# Patient Record
Sex: FEMALE | URBAN - METROPOLITAN AREA
[De-identification: names, ages, dates, MRNs, and addresses within clinical notes are randomized per-mention and may not be internally consistent; named-entity substitution may affect disease eponyms.]

---

## 2019-02-04 ENCOUNTER — APPOINTMENT (OUTPATIENT)
Dept: URBAN - METROPOLITAN AREA CLINIC 190 | Age: 39
Setting detail: DERMATOLOGY
End: 2019-02-05

## 2019-02-04 DIAGNOSIS — Z41.9 ENCOUNTER FOR PROCEDURE FOR PURPOSES OTHER THAN REMEDYING HEALTH STATE, UNSPECIFIED: ICD-10-CM

## 2019-02-04 PROCEDURE — OTHER CHEMICAL PEEL: OTHER

## 2019-02-04 PROCEDURE — OTHER MICRODERMABRASION: OTHER

## 2019-02-04 ASSESSMENT — LOCATION DETAILED DESCRIPTION DERM
LOCATION DETAILED: LEFT SUPERIOR CENTRAL BUCCAL CHEEK
LOCATION DETAILED: RIGHT CHIN
LOCATION DETAILED: LEFT LOWER CUTANEOUS LIP
LOCATION DETAILED: INFERIOR MID FOREHEAD
LOCATION DETAILED: RIGHT INFERIOR CENTRAL MALAR CHEEK
LOCATION DETAILED: LEFT INFERIOR CENTRAL MALAR CHEEK
LOCATION DETAILED: LEFT INFERIOR MEDIAL FOREHEAD

## 2019-02-04 ASSESSMENT — LOCATION SIMPLE DESCRIPTION DERM
LOCATION SIMPLE: INFERIOR FOREHEAD
LOCATION SIMPLE: LEFT FOREHEAD
LOCATION SIMPLE: RIGHT CHEEK
LOCATION SIMPLE: CHIN
LOCATION SIMPLE: LEFT LIP
LOCATION SIMPLE: LEFT CHEEK

## 2019-02-04 ASSESSMENT — LOCATION ZONE DERM
LOCATION ZONE: FACE
LOCATION ZONE: LIP

## 2019-02-04 NOTE — PROCEDURE: CHEMICAL PEEL
Chemical Peel: Skinceuticals 2% Lactic Acid
Treatment Number: 1
Number Of Layers: 2
Treatment Time (Optional): 2 MINUTES
Post-Care Instructions: I reviewed with the patient in detail post-care instructions. Patient should avoid sun exposure and wear sun protection.  Gave patient PCA post procedure kit
Post Peel Care: Reviewed post treatment instructions with the patient. .
Consent: Prior to the procedure, written consent was obtained and risks were reviewed, including but not limited to: redness, peeling, blistering, pigmentary change, scarring, infection, and pain.
Comments: AFTER REMOVAL OF PEEL, APPLIED INTENSE RETINOL SERUM, AVST 2, AND [facility].  \\n\\nPT PURCHASED AVST 1 / SCHEDULED FOR 4 WEEKS MICRODERM - AND POSS MILIA EXTRACTION
Detail Level: Simple
Price (Use Numbers Only, No Special Characters Or $): 0

## 2019-02-04 NOTE — PROCEDURE: MICRODERMABRASION
Consent: Written consent obtained, risks reviewed including but not limited to crusting, scabbing, blistering, scarring, darker or lighter pigmentary change, bruising, and/or incomplete response.
Crystal Flow: regular
Endpoint: mild erythema
Post-Care Instructions: I reviewed with the patient in detail post-care instructions. Patient should stay away from the sun and wear sun protection until treated areas are fully healed.  Gave patient post treatment instructions
Prep Text: CLEANSING GEL, ALPHA TONER FORTE
Price (Use Numbers Only, No Special Characters Or $): 0
Indication: skin texture
Number Of Passes: 2
Treatment Number: 1
Vacuum Pressure Units: inches Hg
Detail Level: Simple
Comments: 45/25 KPA - AFTER TX, USED BRUSH TO WIPE OFF CRYSTALS.  APPLIED LOGAN MASK AND MASSAGED INTO SKIN.  APPLIED CLEANSING MILK AND REMOVED WITH WARM TOWEL.\\n\\nWAS INTERESTED IN BOTH MICRODERM AND DERMAPLANING, ADVISED PATIENT TO DO ONLY ONE TODAY SINCE THIS IS HER FIRST TREATMENT.  PT HAD SOME MILIA, SO WE OPTED TO DO THE MICRODERMABRASION TO HELP BRING THE MILIA TO THE SURFACE.  DISCUSSED DOING POSSIBLE EXTRACTIONS AT NEXT VISIT DEPENDING HOW SKIN IS - ALSO RECOMMENDED A VITAMIN A - PT PURCHASED AVST 1